# Patient Record
Sex: FEMALE | URBAN - METROPOLITAN AREA
[De-identification: names, ages, dates, MRNs, and addresses within clinical notes are randomized per-mention and may not be internally consistent; named-entity substitution may affect disease eponyms.]

---

## 2018-11-07 ENCOUNTER — PRE VISIT (OUTPATIENT)
Dept: SLEEP MEDICINE | Facility: CLINIC | Age: 58
End: 2018-11-07

## 2018-11-07 NOTE — TELEPHONE ENCOUNTER
"  1.  Reason for the visit:  Consult to discuss sleep issues  2.  Referring provider and clinic name:    3.  Previous Sleep Doctor or Pulmonlogist (clinic name)?    4.  Records, Procedures, Imaging, and Labs (see below)          All NOTES from previous office visits that pertain to why they are being seen in the Sleep Center    Previous Sleep Studies, Chest CT, Echos and reports that pertain to why they are seeing Sleep Center    All Sleep records that have been done in the last 2 years that pertain to why they are seeing Sleep Center            Are they being seen for continuation of care for Cpap/Bipap/Avap/Trilogy/Dental Device?     If yes to above Who and Where was Device issued/currently getting supplies from?     Are you currently on \"Supplemental Oxygen\" during the day or night?                                                                                                                                                         Please remind pt to bring Cpap machine and ask to arrive 15 minutes early to appointment due traffic and congestion                                                 5. Pt Sleep Center Packet received Unable to reach pt no working number    Yes: \"please make sure that you bring this to your appointment completed, either the doctor will not see you until this completed or you may be asked to reschedule your appointment.\"     No: mail or email to the pt and explain, \"please make sure that you bring this to your appointment completed, either the doctor will not see you until this completed or you may be asked to reschedule your appointment.\"     ~If pt coming early to fill packet out, ask that they come 30 minutes prior to their appointment~     6. Has the pt's medication list been updated and preferred pharmacy added?     7. Has the allergy list been reviewed?    \"Thank you for choosing [facility] and we look forward to seeing you at your upcoming appointment\"     "

## 2018-11-08 ENCOUNTER — OFFICE VISIT (OUTPATIENT)
Dept: SLEEP MEDICINE | Facility: CLINIC | Age: 58
End: 2018-11-08
Payer: COMMERCIAL

## 2018-11-08 VITALS
BODY MASS INDEX: 24.98 KG/M2 | HEART RATE: 64 BPM | OXYGEN SATURATION: 95 % | DIASTOLIC BLOOD PRESSURE: 67 MMHG | SYSTOLIC BLOOD PRESSURE: 116 MMHG | WEIGHT: 141 LBS | HEIGHT: 63 IN | RESPIRATION RATE: 16 BRPM

## 2018-11-08 DIAGNOSIS — F51.01 PRIMARY INSOMNIA: Primary | ICD-10-CM

## 2018-11-08 PROCEDURE — 99204 OFFICE O/P NEW MOD 45 MIN: CPT | Performed by: INTERNAL MEDICINE

## 2018-11-08 RX ORDER — LACTOBACILLUS COMBINATION NO.4 3B CELL
CAPSULE ORAL
COMMUNITY
Start: 2018-02-22

## 2018-11-08 RX ORDER — MULTIPLE VITAMINS W/ MINERALS TAB 9MG-400MCG
1 TAB ORAL DAILY
COMMUNITY

## 2018-11-08 RX ORDER — LANOLIN ALCOHOL/MO/W.PET/CERES
CREAM (GRAM) TOPICAL
COMMUNITY
Start: 2018-02-22

## 2018-11-08 RX ORDER — VALACYCLOVIR HYDROCHLORIDE 1 G/1
TABLET, FILM COATED ORAL
COMMUNITY
Start: 2018-01-04

## 2018-11-08 RX ORDER — EPINEPHRINE 0.3 MG/.3ML
0.3 INJECTION SUBCUTANEOUS
COMMUNITY
Start: 2018-07-24

## 2018-11-08 RX ORDER — LORAZEPAM 1 MG/1
TABLET ORAL
COMMUNITY
Start: 2018-01-04

## 2018-11-08 NOTE — PATIENT INSTRUCTIONS
[email or web address]    [email or web address]      Your provider has referred you for  Cognitive-Behavioral Therapy for Insomnia or CBTI.    What is CBTI?    CBTI is a highly effective non-drug treatment for chronic insomnia that is recommended by the American College of Physicians as the first treatment for chronic insomnia.  Through numerous clinical studies CBTI has been shown to be superior and safer to sleep medication in the long term.    What does CBTI involve?    CBTI is delivered over the course of several sessions with a sleep psychologist or other provider with specialized training in the behavioral treatment of insomnia and other sleep disorders.      Unlike sleep medication which targets symptoms of insomnia, CBTI involves retraining yourself to sleep. While the treatment takes a bit of time and effort, the evidence shows it produces long-lasting improvements in sleep.  The process works by identifying factors causing your insomnia and providing you with the sleep education and coaching needed to assist you in implementing proven strategies to correct them.     How much effort will it take?    Like changing how you eat or exercising for better fitness, learning how to sleep better takes some time and effort to see results.  It will affect how you think, feel and behave about your sleep.    You will be required to complete a daily sleep diary throughout treatment to record your sleep/wake patterns and progress.  This includes completing two weeks of sleep diaries prior to your first visit.    In order for you to realize the full benefit of treatment, you will need to commit to implementing the various strategies recommended as part of your individualized sleep training program.  CBTI techniques introduced during sessions should be practiced daily for maximum benefit and long-term  improvement in the quality of your sleep.     How long will it take for CBTI to work?    Results vary from patient to patient.  Most patients experience improvement in a few weeks with sleep improving about 50% within 2-3 months.  The goal is not so much more sleep but better quality sleep. The good news is like exercise, if you keep practicing the skills you learn, research shows that your sleep fitness should continue to improve over the next 6-12 months.    Are there side effects?    Unlike many prescribed sleep medications, CBTI is a safe treatment with few side effects.  The most significant side effect during the first few weeks is an increase in daytime sleepiness.     Will I need to stop my sleep medication before I start treatment?    That is a decision between you and your prescribing provider.  Many patients choose to discontinue medication prior to beginning CBTI. However, you should do this only in consultation with your prescribing provider.  You can also do CBTI while you are using sleep medication and if desired, gradually reduce or discontinuing sleep medication as you implement behavioral strategies.

## 2018-11-08 NOTE — MR AVS SNAPSHOT
After Visit Summary   11/8/2018    Chacha Randolph    MRN: [id number]           Patient Information     Date Of Birth          1960        Visit Information        Provider Department      11/8/2018 2:00 PM Denae Canseco MD [facility]        Today's Diagnoses     Primary insomnia    -  1      Care Instructions    [email or web address]    [email or web address]      Your provider has referred you for  Cognitive-Behavioral Therapy for Insomnia or CBTI.    What is CBTI?    CBTI is a highly effective non-drug treatment for chronic insomnia that is recommended by the American College of Physicians as the first treatment for chronic insomnia.  Through numerous clinical studies CBTI has been shown to be superior and safer to sleep medication in the long term.    What does CBTI involve?    CBTI is delivered over the course of several sessions with a sleep psychologist or other provider with specialized training in the behavioral treatment of insomnia and other sleep disorders.      Unlike sleep medication which targets symptoms of insomnia, CBTI involves retraining yourself to sleep. While the treatment takes a bit of time and effort, the evidence shows it produces long-lasting improvements in sleep.  The process works by identifying factors causing your insomnia and providing you with the sleep education and coaching needed to assist you in implementing proven strategies to correct them.     How much effort will it take?    Like changing how you eat or exercising for better fitness, learning how to sleep better takes some time and effort to see results.  It will affect how you think, feel and behave about your sleep.    You will be required to complete a daily sleep diary throughout treatment to record your sleep/wake patterns and progress.  This includes  completing two weeks of sleep diaries prior to your first visit.    In order for you to realize the full benefit of treatment, you will need to commit to implementing the various strategies recommended as part of your individualized sleep training program.  CBTI techniques introduced during sessions should be practiced daily for maximum benefit and long-term improvement in the quality of your sleep.     How long will it take for CBTI to work?    Results vary from patient to patient.  Most patients experience improvement in a few weeks with sleep improving about 50% within 2-3 months.  The goal is not so much more sleep but better quality sleep. The good news is like exercise, if you keep practicing the skills you learn, research shows that your sleep fitness should continue to improve over the next 6-12 months.    Are there side effects?    Unlike many prescribed sleep medications, CBTI is a safe treatment with few side effects.  The most significant side effect during the first few weeks is an increase in daytime sleepiness.     Will I need to stop my sleep medication before I start treatment?    That is a decision between you and your prescribing provider.  Many patients choose to discontinue medication prior to beginning CBTI. However, you should do this only in consultation with your prescribing provider.  You can also do CBTI while you are using sleep medication and if desired, gradually reduce or discontinuing sleep medication as you implement behavioral strategies.               Follow-ups after your visit        Additional Services     SLEEP PSYCHOLOGY REFERRAL       Referral Urgency: Next available    Dr. Wilmer Lorenz is at the following clinics on the following days:  [facility] - [address] [place] [address]        Thursday and Friday (PLEASE CALL [phone] to schedule an appointment).  [facility] [address] [place]       Wednesdays   (PLEASE CALL [phone]  "to schedule an appointment).     Please be aware that coverage of these services is subject to the terms and limitations of your health insurance plan. Call member services at your health plan with any benefit or coverage questions.     Please bring the following to your appointment:  >> List of current medications   >> This referral request   >> Any documents/labs given to you for this referral                  Your next 10 appointments already scheduled     Shashank 15, 2019  1:00 PM CST   New Sleep Patient with Wilmer Lorenz PsyD   [facility] ([facility])    [address] [address] [phone]              Who to contact     If you have questions or need follow up information about today's clinic visit or your schedule please contact [facility] directly at [phone].  Normal or non-critical lab and imaging results will be communicated to you by Tethishart, letter or phone within 4 business days after the clinic has received the results. If you do not hear from us within 7 days, please contact the clinic through MyChart or phone. If you have a critical or abnormal lab result, we will notify you by phone as soon as possible.  Submit refill requests through [employer] or call your pharmacy and they will forward the refill request to us. Please allow 3 business days for your refill to be completed.          Additional Information About Your Visit        [employer] Information     [employer] lets you send messages to your doctor, view your test results, renew your prescriptions, schedule appointments and more. To sign up, go to [email or web address] . Click on \"Log in\" on the left side of the screen, which will take you to the Welcome page. Then click on \"Sign up Now\" on the right side of the page.     You will be asked to enter the access code listed below, as well as some personal information. Please follow the directions to " "create your username and password.     Your access code is: [email or web address]  Expires: 2019  1:16 PM     Your access code will  in 90 days. If you need help or a new code, please call your [facility] clinic or [phone].        Care EveryWhere ID     This is your Care EveryWhere ID. This could be used by other organizations to access your [facility] medical records  [id number]        Your Vitals Were     Pulse Respirations Height Pulse Oximetry BMI (Body Mass Index)       64 16 1.6 m (5' 3\") 95% 24.98 kg/m2        Blood Pressure from Last 3 Encounters:   18 116/67    Weight from Last 3 Encounters:   18 64 kg (141 lb)              We Performed the Following     SLEEP PSYCHOLOGY REFERRAL        Primary Care Provider Office Phone # Fax #    Deven Michele [phone]       [address] [address]        Equal Access to Services     ALEJANDRA COLLIER : Hadii aad ku hadasho Soomaali, waaxda luqadaha, qaybta kaalmada adeegyada, waxay idiin hayaan adeeg kharash la'yovanny . So [facility] [phone].    ATENCIÓN: Si habla español, tiene a brambila disposición servicios gratuitos de asistencia lingüística. Llame al [phone].    We comply with applicable federal civil rights laws and [place] laws. We do not discriminate on the basis of race, color, national origin, age, disability, sex, sexual orientation, or gender identity.            Thank you!     Thank you for choosing [facility]  for your care. Our goal is always to provide you with excellent care. Hearing back from our patients is one way we can continue to improve our services. Please take a few minutes to complete the written survey that you may receive in the mail after your visit with us. Thank you!             Your Updated Medication List - Protect others around you: Learn how to safely use, store and throw away your medicines at www.disposemymeds.org.          This list is accurate as " of 11/8/18  2:59 PM.  Always use your most recent med list.                   Brand Name Dispense Instructions for use Diagnosis    BENADRYL PO      Take 25 mg by mouth        calcium-magnesium 500-250 MG Tabs per tablet    CALMAG     Take 1 tablet by mouth 2 times daily        [employer] PROBIOTIC Caps           cyanocobalamin 1000 MCG tablet    vitamin  B-12          DOCOSAHEXAENOIC ACID PO           EPINEPHrine 0.3 MG/0.3ML injection 2-pack    EPIPEN/ADRENACLICK/or ANY BX GENERIC EQUIV     Inject 0.3 mg into the muscle        LORazepam 1 MG tablet    ATIVAN     0.5-1.0 tablets 30-60 minutes prior to stressful events        magnesium citrate solution      once        MELATONIN PO           Multi-vitamin Tabs tablet      Take 1 tablet by mouth daily        UNABLE TO FIND      MEDICATION NAME: Tranquil Sleep        valACYclovir 1000 mg tablet    VALTREX     Take 2 g twice daily for 1 day (separate doses by ~12 hours)  with the first signs of cold sore        VITAMIN D-1000 MAX ST 1000 units Tabs   Generic drug:  cholecalciferol

## 2018-11-08 NOTE — PROGRESS NOTES
Chief complaint: Chronic insomnia    History of Present Illness: 58-year-old female here with her  describes long history of insomnia complaints.  This is the first time she is seeing sleep provider however.  She is interested in exploring treatments that will allow her to sleep longer, get better quality sleep, without the use of medications.  She had sleep problems all her life.  She has been using diphenhydramine with sleep for over 30 years.  She feels that this does help her get better quality sleep.  She recently discontinued this however due to reports concerns about development of memory issues associated with this medication.  She also has tried other supplements including 5HT, magnesium, and  melatonin with shorter term success.  She is sleepy in the evenings and may fall asleep if she watches TV.  She will go up to bed around 9 :30 PM and usually will fall asleep within 20 minutes or so she does wake up at night and then have difficulty falling back asleep sometimes she will stay up for 2 hours before falling back asleep or not at all.  This is when she tends to have a very active mind and sometimes worry about her inability to fall back asleep and about things going on in her life.  She typically will get out of bed for the day by 5:30 or 6 in the morning.      She does not take naps.  She could not fall asleep if she tried.  She does have frequent headaches associated with her poor quality sleep and will often take acetaminophen for this.  She drinks 1 caffeinated beverage in the morning.  In the evenings she may have a glass of wine, maybe 4-5 glasses total a week.  During the day she is able to function reasonably well.  Besides the headaches however she does also have some cloudy thinking and feels in general less sharp making more mistakes and also has been noted to be irritable.  Meacham is 3.  She has done some exploration on insomnia treatments and has read books she is also watched  YouTube videos about cognitive behavioral therapy for insomnia.  She has never practiced mindfulness but is open to this.  She does do activities that she enjoys including spending time with family including grandchildren and going for walks.  She participates in regular monthly board meetings and works a part-time job as a , typical hours 9:30 in the morning to 3:30 PM, about 20 hours a week.  She lives with her  and they have a very good relationship.  Bedroom is cool, dark, and quiet she does have a white noise from a fan.    There is no history of significant snoring or apneas.  No significant weight gain in fact there is been some mild weight loss.  No restless legs, sleepwalking, sleep talking, or dream enactment behavior.  She denies that pain affects her sleep.    [place] Seepiness Scale:3 (Less than 10 normal)    Past Medical History:   Diagnosis Date     Environmental allergies      Multiple thyroid nodules      Osteopenia        Allergies   Allergen Reactions     No Clinical Screening - See Comments Anaphylaxis     Amoxicillin-Pot Clavulanate      Sever hives and eye swollen.     Lactose      Intolerance       Current Outpatient Prescriptions   Medication     calcium-magnesium (CALMAG) 500-250 MG TABS per tablet     cholecalciferol (VITAMIN D-1000 MAX ST) 1000 units TABS     cyanocobalamin (VITAMIN  B-12) 1000 MCG tablet     DiphenhydrAMINE HCl (BENADRYL PO)     DOCOSAHEXAENOIC ACID PO     EPINEPHrine (EPIPEN/ADRENACLICK/OR ANY BX GENERIC EQUIV) 0.3 MG/0.3ML injection 2-pack     LORazepam (ATIVAN) 1 MG tablet     magnesium citrate solution     MELATONIN PO     multivitamin, therapeutic with minerals (MULTI-VITAMIN) TABS tablet     Probiotic Product ([employer] PROBIOTIC) CAPS     UNABLE TO FIND     valACYclovir (VALTREX) 1000 mg tablet     No current facility-administered medications for this visit.        Social History     Social History     Marital status:      Spouse name: N/A      "Number of children: N/A     Years of education: N/A     Occupational History     Not on file.     Social History Main Topics     Smoking status: Not on file     Smokeless tobacco: Not on file     Alcohol use Not on file     Drug use: Not on file     Sexual activity: Not on file     Other Topics Concern     Not on file     Social History Narrative     No narrative on file       Family History   Problem Relation Age of Onset     Sleep Disorder Mother        Review of Systems: Positve for occasional dry cough, anxiety about sleeping, headaches, and per HPI, otherwise comprehensive review of systems is negative.    EXAM: Pleasant, alert, no distress  /67  Pulse 64  Resp 16  Ht 1.6 m (5' 3\")  Wt 64 kg (141 lb)  SpO2 95%  BMI 24.98 kg/m2  HEENT: Normocephalic/atraumatic, pupils are equal, reactive to light, no scleral icterus  Oropharynx: Mallampati 2, tonsillar stage 1  Neck supple no lymphadenopathy, neck circumference 13 inches  Chest: Clear to auscultation bilaterally, no rales or wheezes  Cardiac: Regular rate and rhythm, S1-S2 normal  Abdomen: Obese, soft and nontender, bowel sounds present  Extremities: Warm, well perfused, no cyanosis clubbing or edema  Psychiatric: Mood and affect appear normal  Neurologic: No gross focal deficits      ASSESSMENT: 58-year-old female with chronic sleep offset insomnia with daytime headaches, fatigue and irritability. Positive family history as well. She has some anxiety but not likely an anxiety disorder. I do not think she has sleep apnea, or restless leg syndrome.  She may benefit from cognitive behavioral therapy for insomnia.    PLAN: Extensive discussion today regarding her goals.  Patient is going to keep a detailed sleep log/diary and bring this with her to see the CBT provider.  Also encouraged her to consider taking mindfulness based stress reduction (MBSR) course [facility].  She and her  are interested in doing this together. I tried to " reassure her that she is likely getting adequate amount of sleep at this time given her lack of daytime sleepiness however she may be able to improve the quality of her sleep and perhaps the duration with treatment.  No sleep study indicated at this time.  I did introduce the concept of sleep restriction as well as some stimulus control to the patient.  Please see patient instructions for further details of counseling provided.    Denae Canseco M.D.  Pulmonary/Critical Care/Sleep Medicine    The above note was dictated using voice recognition software and may include typographical errors. Please contact the author for any clarifications.

## 2018-12-18 ENCOUNTER — OFFICE VISIT (OUTPATIENT)
Dept: SLEEP MEDICINE | Facility: CLINIC | Age: 58
End: 2018-12-18
Attending: INTERNAL MEDICINE
Payer: COMMERCIAL

## 2018-12-18 VITALS
SYSTOLIC BLOOD PRESSURE: 119 MMHG | RESPIRATION RATE: 16 BRPM | HEIGHT: 63 IN | DIASTOLIC BLOOD PRESSURE: 55 MMHG | WEIGHT: 141 LBS | HEART RATE: 51 BPM | OXYGEN SATURATION: 97 % | BODY MASS INDEX: 24.98 KG/M2

## 2018-12-18 DIAGNOSIS — F51.04 CHRONIC INSOMNIA: Primary | ICD-10-CM

## 2018-12-18 PROCEDURE — 90791 PSYCH DIAGNOSTIC EVALUATION: CPT | Performed by: PSYCHOLOGIST

## 2018-12-18 ASSESSMENT — ANXIETY QUESTIONNAIRES
2. NOT BEING ABLE TO STOP OR CONTROL WORRYING: SEVERAL DAYS
3. WORRYING TOO MUCH ABOUT DIFFERENT THINGS: SEVERAL DAYS
5. BEING SO RESTLESS THAT IT IS HARD TO SIT STILL: NOT AT ALL
GAD7 TOTAL SCORE: 3
1. FEELING NERVOUS, ANXIOUS, OR ON EDGE: SEVERAL DAYS
7. FEELING AFRAID AS IF SOMETHING AWFUL MIGHT HAPPEN: NOT AT ALL
6. BECOMING EASILY ANNOYED OR IRRITABLE: NOT AT ALL
IF YOU CHECKED OFF ANY PROBLEMS ON THIS QUESTIONNAIRE, HOW DIFFICULT HAVE THESE PROBLEMS MADE IT FOR YOU TO DO YOUR WORK, TAKE CARE OF THINGS AT HOME, OR GET ALONG WITH OTHER PEOPLE: NOT DIFFICULT AT ALL

## 2018-12-18 ASSESSMENT — MIFFLIN-ST. JEOR: SCORE: 1178.57

## 2018-12-18 ASSESSMENT — PATIENT HEALTH QUESTIONNAIRE - PHQ9
5. POOR APPETITE OR OVEREATING: NOT AT ALL
SUM OF ALL RESPONSES TO PHQ QUESTIONS 1-9: 9

## 2018-12-18 NOTE — PATIENT INSTRUCTIONS
Start program after holidays  You are very motivated so I think that starting with an online program combined with in person sessions would be best.  Anxiety may be playing somewhat of a role in maintaining your insomnia but essentially you have a primary insomnia.    Things you can do right now.  Start a worry journal to complete at least two hours before bed to note things that trouble or concern and write down your next action steps or ways you can calm or be compassionate.  Keep next to bed and if you wake worrying review and update.    Limit time in  7 hours between 9:30 PM - 4:30 AM with alarm.    Avoid clock watching, computers, ipads in bedroom.    No later evening inadvertent naps.  Go to bed sleepy.    Follow these rule nightly:    When someone lays awake in bed over many nights, your body can actually learn to be awake in bed, mainly because that is what has happened so many times.  Your body has actually been  conditioned  or trained to be awake during the night because it has happened so often.  To break this habit you should try to follow these steps to improve your insomnia:    Set a strict bedtime and rise time to keep every day of the week, including weekends,     Go to bed at the set time, but only if you are sleepy (not tired or fatigued but drowsy)    Don t lay in bed for more than 15-30 minutes if you can t sleep.  Get up and go do something relaxing like reading or watching TV until you get drowsy again     Get up at the same time every day regardless of how much sleep you get    Use the bedroom only for sleep and sex.  Do NOT watch TV, read, use the computer, play on your cell phone or do work while in bed      Do not take naps during the daytime and avoid any situations where you might get drowsy or fall asleep unintentionally especially in the evening.      STRATEGIES FOR DEVELOPING A HEALTHY ATTITUDE ABOUT SLEEP    Insomnia is often is triggered by stressful events such as a change in  employment, a separation, medical illness, or loss.  Your response to your sleep problem, mainly your thoughts and behaviors, determines in large part whether the sleep disturbance will resolve or develop a chronic course well after the stressful event is over.  Insomnia is more likely to persist over time if a person interprets this situational insomnia as a sign of danger or loss of control - and because of this begins to monitor sleep loss and worry about its consequences.  Unhealthy worry, fears and beliefs about insomnia can become a vicious cycle of emotional distress and increased sleep disturbance.  Negative beliefs about sleep produce increasing stress and pressure to sleep.  We call this unhelpful sleep effort. The following are strategies for changing beliefs and attitudes about sleep than may be contributing to your continued insomnia.    Changing How You Think About Insomnia      You may be surprised to learn that prior to the 1900 s, most medicines given by physicians were worthless.  Yet, remarkably, patients still improved.  How was this possible?  It was due to the patient s belief in the physician and the medicine, which mobilized strong self-healing mechanisms.  Today, this effect has been referred to as the placebo effect and it is recognized as a significant component of all medical treatments. Recent research has challenged the traditional separation of the mind and body in medicine. The placebo effect is a powerful reminder that that how we think and what we believe has a profound impact on our brain and body.  We now know that by managing stress and thinking more positively we can improve our health and promote healing (1).  Negative sleep thoughts in particular can have a profoundly adverse effect on your insomnia. They can lead to elevated fear or anxiety about sleep which in turn can aggravate your sleep problem.    Examples of Unhelpful Sleep Thoughts    Unhelpful thoughts about sleep can  have a profound impact on your ability to get a good night s sleep. Below are some examples of negative thoughts associated with insomnia that may sound familiar to you:     I must get 8 hours of sleep to function during the day      Insomnia is going to ruin my health      I have lost control of my sleep      I m dreading bedtime      I didn t sleep at all last night and know I won t sleep tomorrow night either      I can t sleep without a sleeping pill       These types of thoughts are often based on fear and myths about how sleep works. They produce feelings of anxiety and fear, emotions that fuel the stress response casing increased heart rate and brain wave activity.  This in turn activates your brain s wakefulness system and weakens your natural sleep drive resulting in increased difficulty falling and staying asleep.      Negative thoughts usually occur automatically and feel like a knee-jerk reaction.  They are often inaccurate and distorted, especially late at night as you become increasingly tired, it is dark, quiet, and others are asleep.      Cognitive Restructuring: Changing Unhelpful Sleep Thoughts    Now that you understand the impact that negative thoughts can have on your sleep, you are ready to change these unhelpful thoughts using cognitive restructuring.  The process - called cognitive restructuring - is powerfully simple:  By recognizing and replacing your negative thoughts about sleep with more accurate, positive thoughts you will be less anxious and frustrated.  You will stop working so hard to sleep.  As a result, you will be able to relax and more easily sleep through the night.    There are several important steps involved in changing your unhelpful and negative thoughts about sleep:    1. The most important first step is recognizing and identifying your unhelpful sleep thoughts and the emotional reaction they produce.     2. A second and equally important step is to identify these thoughts  without any judgment. It is important that you accept your thoughts as they are before you try to reframe and change them.     3.  The next step is to replace or reframe the negative thoughts related to sleep with more accurate and positive thoughts.  To facilitate this process, we have provided a list of positive, accurate thoughts to gradually incorporate into your beliefs about your sleep.  These positive thoughts are based on sleep research.  Read over these positive thoughts and choose those that seem to best reflect your situation.  We also encourage you to generate your own positive and accurate about your sleep.  Whenever you notice a negative thought enter you mind, replace it with one of your positive thoughts.  Rehearse these positive thoughts each day.  By practicing cognitive restructuring each day, you will notice that your frame of mind will shift leading to a much more positive attitude about your sleep.      Examples of Helpful Sleep Thoughts     My work will not suffer significantly if I have a poor night's sleep.      I m probably getting more sleep than I think.  My daytime functioning is not affected only by my sleep.      Since I didn t sleep well last night, I am more likely to sleep well tonight due  to a biological pressure to recover my sleep loss.      Sleep requirements vary from person to person.      In most cases, the worst thing that can happen if I don t sleep well is that my mood might not be as bright the next day.      If I awaken after about five and a half hours of sleep, I have gotten all the sleep I really need.      I m more likely to fall asleep as my body temperature falls throughout the night.      It is normal to initially feel alert if I awaken at the beginning or end of a dream; drowsiness will soon follow.      My functioning will improve during the day as my body temperature rises.      My sleep will be improving as I learn and apply the behavioral techniques I am  taught.      These techniques have worked for others, and they will work for me.     Other Recommendations for Changing  Beliefs and Attitudes About Your Sleep    1. Keep Realistic Expectations:  There is a widespread belief that 8 hours of sleep is necessary to feel refreshed and function well during the day. Some believe that  good sleep  means never waking up at night.  Others come to expect that they should always wake up in the morning feeling completely rested and full of energy.  Concerns and worries may arise when such expectations are not met.  You may believe that you need this much sleep but the fact remains that there are individual differences in sleep requirements.  Some require as little as 4-5 hours whereas others require 10.  Thus, there is no  gold standard  for adequate sleep duration.  To overcome insomnia, you must keep your expectations realistic and understand that some days you may not sleep as well as you desire.  Average normal sleepers experience two nights of disrupted sleep per week. There is no reason to be alarmed by a few nights of poor sleep.  To overcome insomnia you need to avoid placing undue pressure on yourself to achieve certain sleep standards as this is likely to increase performance anxiety and perpetuate your sleep problem.     2. Revise Attributions about the Causes of Insomnia:  There is a natural tendency to attribute   one s sleep problem to external factors (e.g., chemical imbalance, pain, and aging), over which     a person may have little control.  Although these factors may contribute to your sleep disturbance, there are many things you can do to offset the effects of such factors including changing bad sleep habits and unhelpful beliefs about sleep.    3. Don t Blame Insomnia for All Daytime Impairments:  Many individuals blame insomnia for   everything that goes wrong during the day--fatigue, irritability, and concentration problems.  Although poor sleep may  indeed produce some of these consequences, the exclusive attribution of all daytime impairments to insomnia is erroneous and, ultimately, perpetuates the insomnia course.  So ask yourself the following question prior to blaming insomnia for a particular impairment:   Could something else be bothering me and affecting my daytime functioning?      4. Don t Catastrophize after a Poor Night s Sleep:  Sometimes worrying turns into catastrophic  thinking.  Some patients are concerned that insomnia may have serious consequences on their health, others believe poor sleep will deteriorate their physical appearance, and still others see insomnia as an indication of complete loss of control in their lives.  Quite frankly, it is often these types of concerns about the perceived consequences of insomnia, rather than the sleep problem itself, which prompt patients to seek treatment.  Keep in mind that insomnia can be very unpleasant but it is not necessarily dangerous.    5. Don t Place Too Much Emphasis on Sleep:  Some individual significantly reduce their activity level because of poor sleep and lack of energy.  Although sleep occupies nearly one third of our lives, and is a necessary part of our life, don t give it more importance than it deserves.  Make sure you continue to engage in your normal activities despite your insomnia.          6. Develop some Tolerance to the Effects of Sleep Loss:  Instead of  dwelling on insomnia and its negative effects on daytime functioning, a  more productive approach is developing some tolerance to sleep loss.   Encourage yourself to go on with your daily routine and activities            even after a poor night s sleep.  A useful experiment is scheduling a  pleasant activity after a poor night s sleep.  You may learn that sleepiness  alone does not prevent you from doing things you enjoy.    6. Never Try to Sleep:  If we could highlight one cardinal cognitive-behavioral  rule of sleep it  would be  Never Try to Force Yourself to Sleep  because sleep cannot be achieved on command.  Whenever a person tries too hard to control or to accomplish anything, it backfires and actually impairs performance producing classic performance anxiety.  Instead, change whatever unhelpful habits you have that interfere with your biological sleep system, practice relaxation, and allow sleep to come about naturally!  If you find yourself frequently trying too hard to fall asleep, you may wish to try to stay awake instead of trying to fall asleep. You will find this cannot be sustained!         1. MARIA ANTONIA Corrales  (1998) Say Vicki to Insomnia. [employer]: [place]      I recommend you use the online CBTI program to augment your efforts.  It is called Go! To Sleep and is sponsored by the [facility].

## 2018-12-19 ASSESSMENT — ANXIETY QUESTIONNAIRES: GAD7 TOTAL SCORE: 3

## 2018-12-24 NOTE — PROGRESS NOTES
SLEEP MEDICINE CONSULTATION  Sleep Psychology    Name: Chacha Randolph MRN# [id number]   Age: 58 year old YOB: 1960     Date of Consultation: Dec 18, 2018  Consultation is requested by: Denae Canseco MD  [address] [place] [address] [address]  [place] [address]  Primary care provider: Deven Michele    Reason for Sleep Consultation     Chacha Randolph is a 58 year old female seen today for a behavioral sleep medicine consultation because of insomnia.      Assessment and Plan     Sleep Diagnoses/Recommendations:    1. Chronic insomnia  Patient history of insomnia is consistent with chronic and primary psychophysiologic insomnia.  While she has periodically had difficulty with sleep initiation, her primary complaint is that the middle insomnia.  Insomnia appears to be maintained by maladaptive sleep habits, conditioned arousal, chronic use of sedating antihistamine and inadequate sleep hygiene.  Inherent biological factors may also have affected and aggravated insomnia including onset of menopause..  Patient is very motivated to eliminate use of antihistamine agents for sleep.  She was introduced to cognitive behavioral therapy for insomnia and its various modalities including in person, online treatment and hybrid combination of in person and online CBT I.    Patient was introduced to keeping a sleep diary to record progress.  We will begin by implementing a consistent and compressed sleep schedule 7 hours between 9:30 PM-4:30 AM based on her initial sleep diaries.  She will begin making changes to sleep hygiene including avoiding clock watching, use of computers and I pad in the bedroom.  Lastly patient was introduced to cognitive restructuring strategies to address maladaptive thinking and worry and anxiety about her sleep. We also discussed strategies to manage worry that can accompany middle of the night awakenings including early evening listing of concerns and scheduled morning worry time We  discussed elements of hyperarousal that may impact the quality of sleep.  Patient will utilize the [facility] CBT I online program and will be guided through behavior change through telephone and in person follow-ups in our clinic.  She was advised to talk with her physician about gradual discontinuation of use of diphenhydramine in combination with behavioral treatment      See patient instructions for initial treatment recommendations and behavioral sleep plan.    Summary Counseling:      Chacha was provided information about the pathophysiology of insomnia and psychophysiological factors contributing to the onset and maintenance of insomnia, predominantly psychophysiologic and primary.  Treatment option were discussed including component of cognitive-behavioral therapy for insomnia. The benefits and potential early side effects of treatment including increased daytime sleepiness were discussed. She was advised to seek medical advise and consultation around use of or changes to prescription sleep medication, Patient was counseled on the importance of avoiding driving if drowsy.        Follow-up: 4 weeks     History of Present Sleep Complaint     Chacha Randolph is a 58 year old year old female who reports a 40-year history of insomnia.  Precipitants to the onset of insomnia are unclear.  She reports that her symptoms were initially intermittent but that over the last 10 years and certainly since onset of menopause, her symptoms have been more chronic.  She has used Benadryl nightly for 30 years.  It is her goal to get off antihistamine or other sedating medication due to her concern about potential effect on health and cognitive functioning.She also uses melatonin on an intermittent basis.    Patient reports that she typically goes to bed at 9:15 PM and usually gets out of bed about 5:30 AM.  On weekends she may stay in bed for an hour later.  She estimates it takes her between 5-20 minutes to fall asleep.  She  takes 50 mg Benadryl at bedtime.  She usually wakes up 2 times a night.  Her awakenings may vary between 30-90 minutes.  She usually wakes up to go to the bathroom and then has difficulty falling back to sleep.  She will usually stay in bed, may read.  She does report some sleep specific anxiety and worry.  She is concerned about the impact of insomnia on her health.  She is also concerned about use of Benadryl long-term on her health and cognitive functioning.  She feels an ideal sleep schedule would be between 10-6 AM.  A review of her sleep diaries indicates that she is getting approximately 6.5 hours of sleep a night.    Patient indicates she does not watch TV in bed.  She does read in bed.  She states that she occasionally will wake up with an active mind or feel anxious..  She usually stays in bed and waits for her to calm herself down.  Sometime she will wake spontaneously and will read in bed until feeling sleepy again.    Chacha does report sleep specific worry and feels she has lost confidence in her ability to sleep on her own.  She does however report strong motivation to learn how to sleep better without use of sleep aids.       Previous Sleep Studies:    None reported      Sleep Environment:    Chacha reports her bedroom is quiet, comfortable and dark. The atient does have a regular bed partner and she  does have pets in the bedroom.     Sleep Behavior    Chacha does not report pain or discomfort at night. There is not awakening due to heart pounding or racing.     Chacha  snores infrequently.  Other sleep behavior include poor quality of sleep.   Chacha does not report witnessed apneas.  Chacha never experiences morning dry mouth and restless legs.    Chacha  does not report sleep walking, sleep talking, dream enactment, sleep paralysis, cataplexy and hypnogogic/hypnopompic hallucinations. She does occasionally awaken from dreams with an anxious feeling       There is not report of sleep walking as a child.  "    Chacha  denies dozing while driving.     Substance Use:    Tobacco: none    Caffeine: 1-2 caffeine beverages in morning   Alcohol: 4 glasses of wine per week at dinner   Recreational Drugs: none       Screening          [place] Sleepiness Scale  Total score - [place]: 5 .    ELEANOR Total Score: 20       PHQ-9 SCORE 12/18/2018   PHQ-9 Total Score 9       BILL-7 SCORE 12/18/2018   Total Score 3       Patient Activation Score   No flowsheet data found.      Vitals     /55   Pulse 51   Resp 16   Ht 1.6 m (5' 2.99\")   Wt 64 kg (141 lb)   SpO2 97%   BMI 24.98 kg/m       Medical History     Past Medical History:   Diagnosis Date     Environmental allergies      Multiple thyroid nodules      Osteopenia        Current Outpatient Medications   Medication Sig Dispense Refill     calcium-magnesium (CALMAG) 500-250 MG TABS per tablet Take 1 tablet by mouth 2 times daily       cholecalciferol (VITAMIN D-1000 MAX ST) 1000 units TABS        cyanocobalamin (VITAMIN  B-12) 1000 MCG tablet        DiphenhydrAMINE HCl (BENADRYL PO) Take 25 mg by mouth       DOCOSAHEXAENOIC ACID PO        EPINEPHrine (EPIPEN/ADRENACLICK/OR ANY BX GENERIC EQUIV) 0.3 MG/0.3ML injection 2-pack Inject 0.3 mg into the muscle       LORazepam (ATIVAN) 1 MG tablet 0.5-1.0 tablets 30-60 minutes prior to stressful events       magnesium citrate solution once       MELATONIN PO        multivitamin, therapeutic with minerals (MULTI-VITAMIN) TABS tablet Take 1 tablet by mouth daily       Probiotic Product ([employer] PROBIOTIC) CAPS        UNABLE TO FIND MEDICATION NAME: Tranquil Sleep       valACYclovir (VALTREX) 1000 mg tablet Take 2 g twice daily for 1 day (separate doses by ~12 hours)  with the first signs of cold sore         No past surgical history on file.       Allergies   Allergen Reactions     No Clinical Screening - See Comments Anaphylaxis     Amoxicillin-Pot Clavulanate      Sever hives and eye swollen.     Lactose      Intolerance "         Psychiatric History     Prior Psychiatric Diagnoses: none   Psychiatric Hospitalizations: none   Use of Psychotropics none      Chemical Use     Prior Chemical Dependency Treatment: none         Family History     Family History   Problem Relation Age of Onset     Sleep Disorder Mother        Sleep Disorders: family history of inomnia    Social History     Social History     Socioeconomic History     Marital status:      Spouse name: Not on file     Number of children: Not on file     Years of education: Not on file     Highest education level: Not on file   Social Needs     Financial resource strain: Not on file     Food insecurity - worry: Not on file     Food insecurity - inability: Not on file     Transportation needs - medical: Not on file     Transportation needs - non-medical: Not on file   Occupational History     Not on file   Tobacco Use     Smoking status: Not on file   Substance and Sexual Activity     Alcohol use: Not on file     Drug use: Not on file     Sexual activity: Not on file   Other Topics Concern     Not on file   Social History Narrative     Not on file       Trained in social work, works part time.   is an .  Reports very happy marriage, moderate work satisfaction     Mental Status Examination     Chacha presented as appropriately dressed and groomed and was oriented X3 with speech language intact.  The patient was cooperative throughout the evaluation with no signs of hallucinations, delusions, loosening of associations or other thought disturbance.  Mood was normal, euthymic.  Affect was congruent with mood. Insight and judgement intact.  Memory was intact for recent and remote elements.  There was no report of suicidal ideation, intention or plan. Attention and concentration were within normal.      Time Spent:  45 minutes    Copy:   Deven Michele MD  [address] [facility] [address]  [place] [address]    Wilmer Lorenz PsyD,  FABIO, TINA  Diplomate, Behavioral Sleep Medicine  [facility]  [facility] and [place]

## 2019-02-15 ENCOUNTER — HEALTH MAINTENANCE LETTER (OUTPATIENT)
Age: 59
End: 2019-02-15

## 2019-02-15 ENCOUNTER — OFFICE VISIT (OUTPATIENT)
Dept: SLEEP MEDICINE | Facility: CLINIC | Age: 59
End: 2019-02-15
Payer: COMMERCIAL

## 2019-02-15 VITALS
BODY MASS INDEX: 25.34 KG/M2 | OXYGEN SATURATION: 98 % | HEART RATE: 52 BPM | HEIGHT: 63 IN | DIASTOLIC BLOOD PRESSURE: 75 MMHG | WEIGHT: 143 LBS | SYSTOLIC BLOOD PRESSURE: 112 MMHG

## 2019-02-15 DIAGNOSIS — F51.04 CHRONIC INSOMNIA: Primary | ICD-10-CM

## 2019-02-15 PROCEDURE — 90832 PSYTX W PT 30 MINUTES: CPT | Performed by: PSYCHOLOGIST

## 2019-02-15 ASSESSMENT — MIFFLIN-ST. JEOR: SCORE: 1182.77

## 2019-02-15 NOTE — PATIENT INSTRUCTIONS
Consider using CBT-I  for sleep monitoring and stimulus control if you need to re-tune your sleep.    Relaxation apps include CALM and INSIGHT TIMER    .Developing Healthy Sleep Thoughts    Insomnia is often is triggered by stressful events such as a change in employment, a separation, medical illness, or loss.  Your response to your sleep problem, mainly your thoughts and behaviors, determines in large part whether your sleeplessness is short -term or develops into chronic insomnia well after the stressful event is over.     This step of your program involves Cognitive Restructuring, a set of skills to change negative and worrisome thoughts.   Insomnia is more likely to persist if you interpret the onset as a threat or loss of control - and because of this begin to monitor your sleep loss and worry about its consequences.  Worry, fears and untrue beliefs about insomnia can become a vicious cycle that activates your arousal system, strengthen wakefulness and weakens your sleep system.  Negative beliefs about sleep produce increasing stress and pressure to sleep.  We call this unhelpful sleep effort. In this step of your CBT-I program you will learn how to:    ? Change unhelpful thoughts and beliefs about sleep  ? Manage worry before bed                             The following are strategies for changing beliefs and attitudes about sleep than may be contributing to your continued insomnia.    Changing How You Think About Insomnia    We now know that our thoughts and attitudes affect our stress response.  Negative sleep thoughts and unhelpful worry in the evening can have an adverse effect on your insomnia. They can lead to elevated fear or anxiety about sleep, which in turn can aggravate your sleep problem. Thinking more positively and managing worry can improve your health and healing.     Myths about Sleep    ? People need 8 hours of sleep     This is untrue.  Sleep needs vary from person to person.  Most  people need between 6-8 hours to feel alert during the day.  People who sleep 7 hours live longer than those who sleep 8 hours.  Research also suggests people who sleep 5 hours live longer than those who sleep 9 hours  .    ? Insomnia is the same as sleep deprivation    Sleep deprivation is lack of sleep due to failure to allow for adequate sleep time.  This is typically not true for insomnia where people have enough time for sleep and even extend their sleep time trying to get more sleep.  Most people with insomnia get about the same amount of sleep as normal sleepers.  The difference is that with insomnia the sleep is fragmented and less consolidated.     You are Getting More Sleep than You Think    Research using objective sleep tests reveal that people with insomnia get an average of one hour more sleep than they think. This is due in part because the brain misperceives Stage 1 and 2 sleep as being awake.  In addition, stress and arousal while awake in bed changes the brain s perception of time awake.    Examples of Unhealthy Sleep Thoughts    Negative thoughts about sleep can have a profound impact on your ability to get a good night s sleep. Below are some examples of negative thoughts associated with insomnia that may sound familiar to you:    ? I must get 8 hours of sleep to function during the day.  ? I won t get to sleep tonight.  ? Insomnia is going to cause health problems.  ? I am dreading going to bed.  ? I woke up early again.  I know I won t get back to sleep.  ? The reason I feel terrible today is because of my insomnia.  ? I ve totally lost control of my sleep.  ? I can t sleep without a sleeping pill.    Negative thoughts usually occur automatically and feel like a knee-jerk reaction.  They are often untrue and distorted, especially late in the evening as you become increasingly tired and others are asleep.      Cognitive Restructuring:  Changing Unhealthy Sleep Thoughts    Now that you understand  the impact that negative thoughts can have on your sleep, you are ready to change these unhelpful thoughts using cognitive restructuring.  The process is powerful and simple:  By recognizing and replacing your negative thoughts about sleep with more accurate, positive thoughts, you will be less anxious and frustrated about your sleep. A more realistic and positive attitude about sleep will allow you to relax and sleep more easily through the night.          There are several important steps involved in changing your unhelpful and negative thoughts about sleep:            Examples of Healthy Sleep Thoughts    ? My work will not suffer much if I have a poor night's sleep.    ? I m probably getting more sleep than I think.    ? Other things than my sleep affect my daytime functioning.    ? Because I didn t sleep well last night, I am more likely to sleep well tonight because there is increased biological sleep pressure to sleep deeper to recover my sleep loss.    ? Sleep requirements vary from one person to another.    ? If I don t sleep well, most of the time the worst that can happen is that my mood might not be as bright the next day.    ? If I awaken after about five hours of sleep, I have gotten the core sleep I need for the day.    ? I m more likely to fall asleep the longer I ve been awake    ? I m more likely to fall asleep as my body temperature begins to decrease through the night.    ? My functioning will improve during the day as my body s wakefulness system takes charge.    ? These sleep skills have worked for others, and they can work for me.    Other Recommendations for Changing Beliefs and Attitudes About Your Sleep    ? Keep Realistic Expectations     There is a widespread belief that 8 hours of sleep is necessary to feel refreshed and function well during the day. Many believe that good sleep means never waking up at night.  Others come to expect that they should always wake up in the morning feeling  full of energy.  Concerns may arise when your actual sleep falls short of these expectations. To overcome insomnia avoid placing undue pressure on yourself to achieve certain sleep levels.  It increases performance anxiety about sleeping.  Focus on quality sleep not quantity of sleep.      ? Revise Your Thoughts about the Causes of Insomnia      There is a natural tendency to attribute our sleep problems completely to external factors such as a chemical imbalance, pain, aging or things over which we may have little control.  Although these factors may contribute to your insomnia, research shows CBT-I is beneficial even if they are present.    ? Don t Blame Insomnia for All Daytime Impairments    Many individuals blame insomnia for every symptom or concern they experience during the day - fatigue, irritability or lack of concentration. Though poor sleep may produce some of these consequences, it us usually untrue that all daytime impairment is attributable to insomnia.  It is more often that other factors such as stress and co-occurring medical problems affect how you feel during the day.      ? Don t Catastrophize      Catastrophic thinking means making a mountain out of a negative sleep thought molehill.  Some people believe poor sleep will have catastrophic consequences to their physical health, mental health and appearance. Others see insomnia as a complete loss of control.  These perceived consequences of insomnia often prompt people to seek medication or other treatment.  Keep in mind insomnia can be very unpleasant but for the most part is not dangerous.    ? Don t Focus on Sleep     Some people reduce their activity level because of poor sleep.  Although sleep is a necessary part of life, don t make it the focus of your thoughts and concern. Trust that if you engage in health sleep habits, your body will give you the sleep it needs.  Make sure you continue your normal activities despite your  insomnia.    ? Never Try to Sleep      Of all the cognitive rules for a better night s sleep the most important one is this:  Never try to force yourself to sleep.   Whenever a person tries too hard to control anything, it usually backfires and makes things worse. Instead, focus on keeping to your prescribed sleep schedule and practicing the five core sleep habits you have previously learned.     Managing Worry before Bed    All human beings worry.  The extent of our worry is what makes a   difference in our quality of life, happiness and health. Uncontrolled worry can affect your sleep by activating your arousal system.  Worry makes your brain, body and heart behave like there is a danger or threat.  This stress response can make it more difficult to fall asleep and stay asleep. The most common types of worry include:    ? Concerned about unfinished tasks  ? Concern over family, finances or work  ? Worry about things beyond your control.     Constructive Worry Time    The part of our brain that plans, sorts, and helps manage our emotions is less effective in doing its job as nighttime comes.  Without the usual distractions of the day, we tend to worry more and have trouble putting aside our worries.  A simple technique called Scheduled Worry Time can help to reduce and manage worry as you approach bedtime or in the middle of the night. It is most effective when practiced daily.

## 2019-02-15 NOTE — NURSING NOTE
"Chief Complaint   Patient presents with     Sleep Problem     follow up       Initial /75   Pulse 52   Ht 1.6 m (5' 3\")   Wt 64.9 kg (143 lb)   SpO2 98%   BMI 25.33 kg/m   Estimated body mass index is 25.33 kg/m  as calculated from the following:    Height as of this encounter: 1.6 m (5' 3\").    Weight as of this encounter: 64.9 kg (143 lb).    Medication Reconciliation: complete      "

## 2019-02-16 NOTE — PROGRESS NOTES
"SLEEP MEDICINE PROGRESS NOTE  Sleep Psychology    Patient Name: Chacha Randolph  MRN:  [id number]  Date of Service: Feb 15, 2019       Subjective Report     Chacha Randolph returns to the sleep clinic today to discuss progress in implementing bbehavioral strategies for the management of chronic insomnia.  Chacha reports she completed the [facility] online CBT program.  She indicates that she had good results as were reflected in reduction in her insomnia severity index.  She reports that her sleep efficiency has improved from the 70% range to approximately 85-90%.  She indicates that she is getting approximately 6.5 hours of sleep and feels she is suctioning and feeling better during the day.     .     Sleep Data:     Source of Data: Online CBT I data    Sleep Latency: Less than 20 min.  Times Awakened: 1-2  Wake Time After Sleep Onset: Less than 30 min.  Total Sleep Time: Approximately 390 min.  Sleep Efficiency: Greater than 85 %    Total score - [place]: 2 .    ELEANOR Total Score: 13    ELEANOR now in subclinical range   Interventions     Strategies and recommendations including maintenance of improvements achieved with online CBT were discussed today.       Vital Signs     /75   Pulse 52   Ht 1.6 m (5' 3\")   Wt 64.9 kg (143 lb)   SpO2 98%   BMI 25.33 kg/m       Mental Status     Appearance:  Well kept  Orientation:  X3  Mood:  good  Affect:  Congruent with mood  Speech/Language:  Normal  Thought Process: Intact  Associations:  Normal  Thought Content: Normal    Diagnostic Impressions and Plan       1. Chronic insomnia  Patient insomnia has gone from moderately severe to subclinical range with patient reporting significant improvement in sleep latency, wake after sleep onset and sleep consolidation.  Patient was provided information on relapse prevention and anticipation of continued improvement with practice of new behaviors.        Follow-up:   Treatment complete, follow-up as needed if symptoms recur    Time " Spent: 25 minutes      Wilmer Lorenz PsyD, LP, CBSM  Certified, Behavioral Sleep Medicine  [facility] - [place] and [place]

## 2019-09-28 ENCOUNTER — HEALTH MAINTENANCE LETTER (OUTPATIENT)
Age: 59
End: 2019-09-28

## 2021-01-10 ENCOUNTER — HEALTH MAINTENANCE LETTER (OUTPATIENT)
Age: 61
End: 2021-01-10

## 2021-10-23 ENCOUNTER — HEALTH MAINTENANCE LETTER (OUTPATIENT)
Age: 61
End: 2021-10-23

## 2022-02-12 ENCOUNTER — HEALTH MAINTENANCE LETTER (OUTPATIENT)
Age: 62
End: 2022-02-12

## 2022-10-09 ENCOUNTER — HEALTH MAINTENANCE LETTER (OUTPATIENT)
Age: 62
End: 2022-10-09

## 2023-02-18 ENCOUNTER — HEALTH MAINTENANCE LETTER (OUTPATIENT)
Age: 63
End: 2023-02-18

## 2023-05-30 ENCOUNTER — LAB REQUISITION (OUTPATIENT)
Dept: LAB | Facility: CLINIC | Age: 63
End: 2023-05-30

## 2023-05-30 DIAGNOSIS — Z12.4 ENCOUNTER FOR SCREENING FOR MALIGNANT NEOPLASM OF CERVIX: ICD-10-CM

## 2023-05-30 PROCEDURE — G0145 SCR C/V CYTO,THINLAYER,RESCR: HCPCS | Performed by: NURSE PRACTITIONER

## 2023-05-30 PROCEDURE — 87624 HPV HI-RISK TYP POOLED RSLT: CPT | Performed by: NURSE PRACTITIONER

## 2023-06-02 LAB
BKR LAB AP GYN ADEQUACY: NORMAL
BKR LAB AP GYN INTERPRETATION: NORMAL
BKR LAB AP HPV REFLEX: NORMAL
BKR LAB AP LMP: NORMAL
BKR LAB AP PREVIOUS ABNL DX: NORMAL
BKR LAB AP PREVIOUS ABNORMAL: NORMAL
PATH REPORT.COMMENTS IMP SPEC: NORMAL
PATH REPORT.COMMENTS IMP SPEC: NORMAL
PATH REPORT.RELEVANT HX SPEC: NORMAL

## 2023-06-06 LAB
HUMAN PAPILLOMA VIRUS 16 DNA: NEGATIVE
HUMAN PAPILLOMA VIRUS 18 DNA: NEGATIVE
HUMAN PAPILLOMA VIRUS FINAL DIAGNOSIS: NORMAL
HUMAN PAPILLOMA VIRUS OTHER HR: NEGATIVE

## 2023-10-28 ENCOUNTER — HEALTH MAINTENANCE LETTER (OUTPATIENT)
Age: 63
End: 2023-10-28

## 2024-03-16 ENCOUNTER — HEALTH MAINTENANCE LETTER (OUTPATIENT)
Age: 64
End: 2024-03-16

## 2025-02-09 NOTE — NURSING NOTE
"Chief Complaint   Patient presents with     Sleep Problem     Middle of night awakening, inability to return to sleep        Initial /55   Pulse 51   Resp 16   Ht 1.6 m (5' 2.99\")   Wt 64 kg (141 lb)   SpO2 97%   BMI 24.98 kg/m   Estimated body mass index is 24.98 kg/m  as calculated from the following:    Height as of this encounter: 1.6 m (5' 2.99\").    Weight as of this encounter: 64 kg (141 lb).    Medication Reconciliation: complete    ESS 5    Alka Major MA    " 3 (mild pain)